# Patient Record
Sex: FEMALE | Race: WHITE | NOT HISPANIC OR LATINO | URBAN - METROPOLITAN AREA
[De-identification: names, ages, dates, MRNs, and addresses within clinical notes are randomized per-mention and may not be internally consistent; named-entity substitution may affect disease eponyms.]

---

## 2017-01-05 ENCOUNTER — OUTPATIENT (OUTPATIENT)
Dept: OUTPATIENT SERVICES | Facility: HOSPITAL | Age: 77
LOS: 1 days | End: 2017-01-05
Payer: MEDICARE

## 2017-01-05 PROCEDURE — 73502 X-RAY EXAM HIP UNI 2-3 VIEWS: CPT

## 2017-01-05 PROCEDURE — 71020: CPT | Mod: 26

## 2017-01-05 PROCEDURE — 73502 X-RAY EXAM HIP UNI 2-3 VIEWS: CPT | Mod: 26

## 2017-01-05 PROCEDURE — 71046 X-RAY EXAM CHEST 2 VIEWS: CPT

## 2017-01-11 ENCOUNTER — OUTPATIENT (OUTPATIENT)
Dept: OUTPATIENT SERVICES | Facility: HOSPITAL | Age: 77
LOS: 1 days | End: 2017-01-11
Payer: MEDICARE

## 2017-01-11 ENCOUNTER — APPOINTMENT (OUTPATIENT)
Dept: HEART AND VASCULAR | Facility: CLINIC | Age: 77
End: 2017-01-11

## 2017-01-11 VITALS — WEIGHT: 120 LBS | BODY MASS INDEX: 18.83 KG/M2 | HEIGHT: 67 IN

## 2017-01-11 DIAGNOSIS — Z01.818 ENCOUNTER FOR OTHER PREPROCEDURAL EXAMINATION: ICD-10-CM

## 2017-01-11 DIAGNOSIS — I10 ESSENTIAL (PRIMARY) HYPERTENSION: ICD-10-CM

## 2017-01-11 DIAGNOSIS — Z22.321 CARRIER OR SUSPECTED CARRIER OF METHICILLIN SUSCEPTIBLE STAPHYLOCOCCUS AUREUS: ICD-10-CM

## 2017-01-11 DIAGNOSIS — Z87.891 PERSONAL HISTORY OF NICOTINE DEPENDENCE: ICD-10-CM

## 2017-01-11 LAB
ALBUMIN SERPL ELPH-MCNC: 4.1 G/DL — SIGNIFICANT CHANGE UP (ref 3.4–5)
ALP SERPL-CCNC: 141 U/L — HIGH (ref 40–120)
ALT FLD-CCNC: 15 U/L — SIGNIFICANT CHANGE UP (ref 12–42)
ANION GAP SERPL CALC-SCNC: 2 MMOL/L — LOW (ref 9–16)
APPEARANCE UR: CLEAR — SIGNIFICANT CHANGE UP
APTT BLD: 32.7 SEC — SIGNIFICANT CHANGE UP (ref 27.5–37.4)
AST SERPL-CCNC: 19 U/L — SIGNIFICANT CHANGE UP (ref 15–37)
BACTERIA # UR AUTO: (no result) /HPF
BILIRUB SERPL-MCNC: 0.4 MG/DL — SIGNIFICANT CHANGE UP (ref 0.2–1.2)
BILIRUB UR-MCNC: NEGATIVE — SIGNIFICANT CHANGE UP
BLD GP AB SCN SERPL QL: NEGATIVE — SIGNIFICANT CHANGE UP
BUN SERPL-MCNC: 25 MG/DL — HIGH (ref 7–23)
CALCIUM SERPL-MCNC: 10.1 MG/DL — SIGNIFICANT CHANGE UP (ref 8.5–10.5)
CHLORIDE SERPL-SCNC: 105 MMOL/L — SIGNIFICANT CHANGE UP (ref 96–108)
CHOLEST SERPL-MCNC: 165 MG/DL — SIGNIFICANT CHANGE UP
CO2 SERPL-SCNC: 33 MMOL/L — HIGH (ref 22–31)
COLOR SPEC: YELLOW — SIGNIFICANT CHANGE UP
COMMENT - URINE: SIGNIFICANT CHANGE UP
CREAT SERPL-MCNC: 0.69 MG/DL — SIGNIFICANT CHANGE UP (ref 0.5–1.3)
CRP SERPL-MCNC: <0.29 MG/DL — SIGNIFICANT CHANGE UP
DIFF PNL FLD: (no result)
EPI CELLS # UR: SIGNIFICANT CHANGE UP /HPF
ERYTHROCYTE [SEDIMENTATION RATE] IN BLOOD: 16 MM/HR — SIGNIFICANT CHANGE UP
GLUCOSE SERPL-MCNC: 84 MG/DL — SIGNIFICANT CHANGE UP (ref 70–99)
GLUCOSE UR QL: NEGATIVE — SIGNIFICANT CHANGE UP
HCT VFR BLD CALC: 34.1 % — LOW (ref 34.5–45)
HDLC SERPL-MCNC: 80 MG/DL — SIGNIFICANT CHANGE UP
HGB BLD-MCNC: 11.3 G/DL — LOW (ref 11.5–15.5)
INR BLD: 0.99 — SIGNIFICANT CHANGE UP (ref 0.88–1.16)
KETONES UR-MCNC: NEGATIVE — SIGNIFICANT CHANGE UP
LEUKOCYTE ESTERASE UR-ACNC: (no result)
LIPID PNL WITH DIRECT LDL SERPL: 51 MG/DL — SIGNIFICANT CHANGE UP
MCHC RBC-ENTMCNC: 31.5 PG — SIGNIFICANT CHANGE UP (ref 27–34)
MCHC RBC-ENTMCNC: 33.1 G/DL — SIGNIFICANT CHANGE UP (ref 32–36)
MCV RBC AUTO: 95 FL — SIGNIFICANT CHANGE UP (ref 80–100)
NITRITE UR-MCNC: NEGATIVE — SIGNIFICANT CHANGE UP
PH UR: 6 — SIGNIFICANT CHANGE UP (ref 4–8)
PLATELET # BLD AUTO: 416 K/UL — HIGH (ref 150–400)
POTASSIUM SERPL-MCNC: 5.2 MMOL/L — SIGNIFICANT CHANGE UP (ref 3.5–5.3)
POTASSIUM SERPL-SCNC: 5.2 MMOL/L — SIGNIFICANT CHANGE UP (ref 3.5–5.3)
PROT SERPL-MCNC: 7.4 G/DL — SIGNIFICANT CHANGE UP (ref 6.4–8.2)
PROT UR-MCNC: NEGATIVE MG/DL — SIGNIFICANT CHANGE UP
PROTHROM AB SERPL-ACNC: 11 SEC — SIGNIFICANT CHANGE UP (ref 10–13.1)
RBC # BLD: 3.59 M/UL — LOW (ref 3.8–5.2)
RBC # FLD: 13 % — SIGNIFICANT CHANGE UP (ref 10.3–16.9)
RBC CASTS # UR COMP ASSIST: < 5 /HPF — SIGNIFICANT CHANGE UP
RH IG SCN BLD-IMP: POSITIVE — SIGNIFICANT CHANGE UP
SODIUM SERPL-SCNC: 140 MMOL/L — SIGNIFICANT CHANGE UP (ref 135–145)
SP GR SPEC: <=1.005 — SIGNIFICANT CHANGE UP (ref 1–1.03)
TOTAL CHOLESTEROL/HDL RATIO MEASUREMENT: 2.1 RATIO — SIGNIFICANT CHANGE UP
TRIGL SERPL-MCNC: 168 MG/DL — HIGH
UROBILINOGEN FLD QL: 0.2 E.U./DL — SIGNIFICANT CHANGE UP
WBC # BLD: 10.8 K/UL — HIGH (ref 3.8–10.5)
WBC # FLD AUTO: 10.8 K/UL — HIGH (ref 3.8–10.5)
WBC UR QL: > 10 /HPF

## 2017-01-11 PROCEDURE — 87641 MR-STAPH DNA AMP PROBE: CPT

## 2017-01-11 RX ORDER — LISINOPRIL 10 MG/1
10 TABLET ORAL
Refills: 0 | Status: ACTIVE | COMMUNITY

## 2017-01-11 RX ORDER — ASCORBIC ACID 500 MG
TABLET ORAL
Refills: 0 | Status: ACTIVE | COMMUNITY

## 2017-01-11 RX ORDER — ALENDRONATE SODIUM 70 MG/1
70 TABLET ORAL
Refills: 0 | Status: ACTIVE | COMMUNITY

## 2017-01-11 RX ORDER — ATORVASTATIN CALCIUM 20 MG/1
20 TABLET, FILM COATED ORAL
Refills: 0 | Status: ACTIVE | COMMUNITY

## 2017-01-11 RX ORDER — ATENOLOL 25 MG/1
25 TABLET ORAL
Refills: 0 | Status: ACTIVE | COMMUNITY

## 2017-01-11 RX ORDER — CHROMIUM 200 MCG
TABLET ORAL
Refills: 0 | Status: ACTIVE | COMMUNITY

## 2017-01-11 RX ORDER — UBIDECARENONE/VIT E ACET 100MG-5
CAPSULE ORAL
Refills: 0 | Status: ACTIVE | COMMUNITY

## 2017-01-12 VITALS — HEART RATE: 64 BPM | DIASTOLIC BLOOD PRESSURE: 70 MMHG | RESPIRATION RATE: 16 BRPM | SYSTOLIC BLOOD PRESSURE: 112 MMHG

## 2017-01-12 PROBLEM — I10 HYPERTENSION: Status: ACTIVE | Noted: 2017-01-12

## 2017-01-12 LAB
MRSA PCR RESULT.: NEGATIVE — SIGNIFICANT CHANGE UP
S AUREUS DNA NOSE QL NAA+PROBE: NEGATIVE — SIGNIFICANT CHANGE UP

## 2017-01-13 LAB
-  AMPICILLIN/SULBACTAM: SIGNIFICANT CHANGE UP
-  AMPICILLIN: SIGNIFICANT CHANGE UP
-  CEFAZOLIN: SIGNIFICANT CHANGE UP
-  CEFTRIAXONE: SIGNIFICANT CHANGE UP
-  CIPROFLOXACIN: SIGNIFICANT CHANGE UP
-  GENTAMICIN: SIGNIFICANT CHANGE UP
-  NITROFURANTOIN: SIGNIFICANT CHANGE UP
-  PIPERACILLIN/TAZOBACTAM: SIGNIFICANT CHANGE UP
-  TOBRAMYCIN: SIGNIFICANT CHANGE UP
-  TRIMETHOPRIM/SULFAMETHOXAZOLE: SIGNIFICANT CHANGE UP
CULTURE RESULTS: SIGNIFICANT CHANGE UP
METHOD TYPE: SIGNIFICANT CHANGE UP
ORGANISM # SPEC MICROSCOPIC CNT: SIGNIFICANT CHANGE UP
ORGANISM # SPEC MICROSCOPIC CNT: SIGNIFICANT CHANGE UP
SPECIMEN SOURCE: SIGNIFICANT CHANGE UP

## 2017-01-23 ENCOUNTER — MEDICATION RENEWAL (OUTPATIENT)
Age: 77
End: 2017-01-23

## 2017-01-23 DIAGNOSIS — Z87.440 PERSONAL HISTORY OF URINARY (TRACT) INFECTIONS: ICD-10-CM

## 2017-01-23 RX ORDER — CIPROFLOXACIN HYDROCHLORIDE 500 MG/1
500 TABLET, FILM COATED ORAL TWICE DAILY
Qty: 10 | Refills: 0 | Status: ACTIVE | COMMUNITY
Start: 1900-01-01 | End: 1900-01-01

## 2017-01-31 ENCOUNTER — RESULT REVIEW (OUTPATIENT)
Age: 77
End: 2017-01-31

## 2017-01-31 VITALS
WEIGHT: 119.93 LBS | OXYGEN SATURATION: 99 % | HEIGHT: 66.5 IN | SYSTOLIC BLOOD PRESSURE: 146 MMHG | TEMPERATURE: 98 F | RESPIRATION RATE: 16 BRPM | HEART RATE: 75 BPM | DIASTOLIC BLOOD PRESSURE: 70 MMHG

## 2017-01-31 NOTE — PATIENT PROFILE ADULT. - PSH
History of bilateral hip replacements  delivery delivered  x 2  History of bilateral hip replacements

## 2017-01-31 NOTE — PATIENT PROFILE ADULT. - PMH
HTN (hypertension)    Hyperlipidemia HTN (hypertension)    Hyperlipidemia    UTI (urinary tract infection)  treated with Cipro; last dose 1/27/17

## 2017-01-31 NOTE — PRE-OP CHECKLIST - SELECT TESTS ORDERED
cardiac clearance, mrsa neg,mssa neg/Urinalysis/EKG/CMP/CBC/PT/PTT/CXR EKG/CBC/INR/cardiac clearance, mrsa neg,mssa neg/CXR/CMP/Urinalysis/PT/PTT

## 2017-02-01 ENCOUNTER — INPATIENT (INPATIENT)
Facility: HOSPITAL | Age: 77
LOS: 2 days | Discharge: HOME | DRG: 468 | End: 2017-02-04
Attending: ORTHOPAEDIC SURGERY | Admitting: ORTHOPAEDIC SURGERY
Payer: MEDICARE

## 2017-02-01 DIAGNOSIS — T84.038A MECHANICAL LOOSENING OF OTHER INTERNAL PROSTHETIC JOINT, INITIAL ENCOUNTER: ICD-10-CM

## 2017-02-01 DIAGNOSIS — Z96.643 PRESENCE OF ARTIFICIAL HIP JOINT, BILATERAL: Chronic | ICD-10-CM

## 2017-02-01 LAB
APPEARANCE UR: CLEAR — SIGNIFICANT CHANGE UP
BASE EXCESS BLDA CALC-SCNC: -4.8 MMOL/L — LOW (ref -2–3)
BILIRUB UR-MCNC: NEGATIVE — SIGNIFICANT CHANGE UP
CA-I BLDA-SCNC: 1.11 MMOL/L — SIGNIFICANT CHANGE UP (ref 1.1–1.3)
COHGB MFR BLDA: 0.3 % — SIGNIFICANT CHANGE UP
COLOR SPEC: YELLOW — SIGNIFICANT CHANGE UP
DIFF PNL FLD: (no result)
GAS PNL BLDA: SIGNIFICANT CHANGE UP
GLUCOSE UR QL: NEGATIVE — SIGNIFICANT CHANGE UP
HCO3 BLDA-SCNC: 21 MMOL/L — SIGNIFICANT CHANGE UP (ref 21–28)
HCT VFR BLD CALC: 31.3 % — LOW (ref 34.5–45)
HGB BLD-MCNC: 10.5 G/DL — LOW (ref 11.5–15.5)
HGB BLDA-MCNC: 8.8 G/DL — LOW (ref 11.5–15.5)
KETONES UR-MCNC: (no result) MG/DL
LEUKOCYTE ESTERASE UR-ACNC: (no result)
MCHC RBC-ENTMCNC: 29.7 PG — SIGNIFICANT CHANGE UP (ref 27–34)
MCHC RBC-ENTMCNC: 33.5 G/DL — SIGNIFICANT CHANGE UP (ref 32–36)
MCV RBC AUTO: 88.7 FL — SIGNIFICANT CHANGE UP (ref 80–100)
METHGB MFR BLDA: 0.3 % — SIGNIFICANT CHANGE UP
NITRITE UR-MCNC: NEGATIVE — SIGNIFICANT CHANGE UP
O2 CT VFR BLDA CALC: SIGNIFICANT CHANGE UP (ref 15–23)
OXYHGB MFR BLDA: 98 % — SIGNIFICANT CHANGE UP (ref 94–100)
PCO2 BLDA: 40 MMHG — SIGNIFICANT CHANGE UP (ref 32–45)
PH BLDA: 7.33 — LOW (ref 7.35–7.45)
PH UR: 6.5 — SIGNIFICANT CHANGE UP (ref 4–8)
PLATELET # BLD AUTO: 255 K/UL — SIGNIFICANT CHANGE UP (ref 150–400)
PO2 BLDA: 216 MMHG — HIGH (ref 83–108)
POTASSIUM BLDA-SCNC: 3.7 MMOL/L — SIGNIFICANT CHANGE UP (ref 3.5–4.9)
PROT UR-MCNC: NEGATIVE MG/DL — SIGNIFICANT CHANGE UP
RBC # BLD: 3.53 M/UL — LOW (ref 3.8–5.2)
RBC # FLD: 16.5 % — SIGNIFICANT CHANGE UP (ref 10.3–16.9)
SAO2 % BLDA: 99 % — SIGNIFICANT CHANGE UP (ref 95–100)
SODIUM BLDA-SCNC: 136 MMOL/L — LOW (ref 138–146)
SP GR SPEC: 1.02 — SIGNIFICANT CHANGE UP (ref 1–1.03)
UROBILINOGEN FLD QL: 0.2 E.U./DL — SIGNIFICANT CHANGE UP
WBC # BLD: 10.1 K/UL — SIGNIFICANT CHANGE UP (ref 3.8–10.5)
WBC # FLD AUTO: 10.1 K/UL — SIGNIFICANT CHANGE UP (ref 3.8–10.5)

## 2017-02-01 PROCEDURE — P9016: CPT

## 2017-02-01 PROCEDURE — 81003 URINALYSIS AUTO W/O SCOPE: CPT

## 2017-02-01 PROCEDURE — 86850 RBC ANTIBODY SCREEN: CPT

## 2017-02-01 PROCEDURE — 86900 BLOOD TYPING SEROLOGIC ABO: CPT

## 2017-02-01 PROCEDURE — 87186 SC STD MICRODIL/AGAR DIL: CPT

## 2017-02-01 PROCEDURE — 85730 THROMBOPLASTIN TIME PARTIAL: CPT

## 2017-02-01 PROCEDURE — 85027 COMPLETE CBC AUTOMATED: CPT

## 2017-02-01 PROCEDURE — 72170 X-RAY EXAM OF PELVIS: CPT | Mod: 26

## 2017-02-01 PROCEDURE — 36430 TRANSFUSION BLD/BLD COMPNT: CPT

## 2017-02-01 PROCEDURE — 80061 LIPID PANEL: CPT

## 2017-02-01 PROCEDURE — 86923 COMPATIBILITY TEST ELECTRIC: CPT

## 2017-02-01 PROCEDURE — 81001 URINALYSIS AUTO W/SCOPE: CPT

## 2017-02-01 PROCEDURE — 86140 C-REACTIVE PROTEIN: CPT

## 2017-02-01 PROCEDURE — 85652 RBC SED RATE AUTOMATED: CPT

## 2017-02-01 PROCEDURE — 87086 URINE CULTURE/COLONY COUNT: CPT

## 2017-02-01 PROCEDURE — 86901 BLOOD TYPING SEROLOGIC RH(D): CPT

## 2017-02-01 PROCEDURE — 85610 PROTHROMBIN TIME: CPT

## 2017-02-01 PROCEDURE — 80053 COMPREHEN METABOLIC PANEL: CPT

## 2017-02-01 RX ORDER — LISINOPRIL 2.5 MG/1
1 TABLET ORAL
Qty: 0 | Refills: 0 | COMMUNITY

## 2017-02-01 RX ORDER — FAMOTIDINE 10 MG/ML
20 INJECTION INTRAVENOUS EVERY 12 HOURS
Qty: 0 | Refills: 0 | Status: DISCONTINUED | OUTPATIENT
Start: 2017-02-01 | End: 2017-02-04

## 2017-02-01 RX ORDER — OXYCODONE HYDROCHLORIDE 5 MG/1
10 TABLET ORAL EVERY 12 HOURS
Qty: 0 | Refills: 0 | Status: DISCONTINUED | OUTPATIENT
Start: 2017-02-02 | End: 2017-02-04

## 2017-02-01 RX ORDER — SODIUM CHLORIDE 9 MG/ML
1000 INJECTION, SOLUTION INTRAVENOUS
Qty: 0 | Refills: 0 | Status: DISCONTINUED | OUTPATIENT
Start: 2017-02-01 | End: 2017-02-04

## 2017-02-01 RX ORDER — OXYCODONE HYDROCHLORIDE 5 MG/1
5 TABLET ORAL EVERY 4 HOURS
Qty: 0 | Refills: 0 | Status: DISCONTINUED | OUTPATIENT
Start: 2017-02-02 | End: 2017-02-04

## 2017-02-01 RX ORDER — ATENOLOL 25 MG/1
1 TABLET ORAL
Qty: 0 | Refills: 0 | COMMUNITY

## 2017-02-01 RX ORDER — POLYETHYLENE GLYCOL 3350 17 G/17G
17 POWDER, FOR SOLUTION ORAL DAILY
Qty: 0 | Refills: 0 | Status: DISCONTINUED | OUTPATIENT
Start: 2017-02-02 | End: 2017-02-04

## 2017-02-01 RX ORDER — LISINOPRIL 2.5 MG/1
10 TABLET ORAL DAILY
Qty: 0 | Refills: 0 | Status: DISCONTINUED | OUTPATIENT
Start: 2017-02-01 | End: 2017-02-04

## 2017-02-01 RX ORDER — MORPHINE SULFATE 50 MG/1
2 CAPSULE, EXTENDED RELEASE ORAL
Qty: 0 | Refills: 0 | Status: DISCONTINUED | OUTPATIENT
Start: 2017-02-01 | End: 2017-02-04

## 2017-02-01 RX ORDER — ASPIRIN/CALCIUM CARB/MAGNESIUM 324 MG
325 TABLET ORAL
Qty: 0 | Refills: 0 | Status: DISCONTINUED | OUTPATIENT
Start: 2017-02-02 | End: 2017-02-04

## 2017-02-01 RX ORDER — CELECOXIB 200 MG/1
400 CAPSULE ORAL ONCE
Qty: 0 | Refills: 0 | Status: COMPLETED | OUTPATIENT
Start: 2017-02-01 | End: 2017-02-01

## 2017-02-01 RX ORDER — ATENOLOL 25 MG/1
25 TABLET ORAL DAILY
Qty: 0 | Refills: 0 | Status: DISCONTINUED | OUTPATIENT
Start: 2017-02-01 | End: 2017-02-04

## 2017-02-01 RX ORDER — ONDANSETRON 8 MG/1
4 TABLET, FILM COATED ORAL EVERY 6 HOURS
Qty: 0 | Refills: 0 | Status: DISCONTINUED | OUTPATIENT
Start: 2017-02-01 | End: 2017-02-04

## 2017-02-01 RX ORDER — OXYCODONE HYDROCHLORIDE 5 MG/1
10 TABLET ORAL ONCE
Qty: 0 | Refills: 0 | Status: DISCONTINUED | OUTPATIENT
Start: 2017-02-01 | End: 2017-02-01

## 2017-02-01 RX ORDER — OXYCODONE HYDROCHLORIDE 5 MG/1
10 TABLET ORAL EVERY 4 HOURS
Qty: 0 | Refills: 0 | Status: DISCONTINUED | OUTPATIENT
Start: 2017-02-02 | End: 2017-02-04

## 2017-02-01 RX ORDER — ACETAMINOPHEN 500 MG
650 TABLET ORAL EVERY 6 HOURS
Qty: 0 | Refills: 0 | Status: DISCONTINUED | OUTPATIENT
Start: 2017-02-02 | End: 2017-02-04

## 2017-02-01 RX ORDER — ATORVASTATIN CALCIUM 80 MG/1
1 TABLET, FILM COATED ORAL
Qty: 0 | Refills: 0 | COMMUNITY

## 2017-02-01 RX ORDER — MAGNESIUM HYDROXIDE 400 MG/1
30 TABLET, CHEWABLE ORAL DAILY
Qty: 0 | Refills: 0 | Status: DISCONTINUED | OUTPATIENT
Start: 2017-02-02 | End: 2017-02-04

## 2017-02-01 RX ORDER — CEFAZOLIN SODIUM 1 G
2000 VIAL (EA) INJECTION EVERY 8 HOURS
Qty: 0 | Refills: 0 | Status: COMPLETED | OUTPATIENT
Start: 2017-02-02 | End: 2017-02-02

## 2017-02-01 RX ORDER — SENNA PLUS 8.6 MG/1
2 TABLET ORAL AT BEDTIME
Qty: 0 | Refills: 0 | Status: DISCONTINUED | OUTPATIENT
Start: 2017-02-02 | End: 2017-02-04

## 2017-02-01 RX ADMIN — OXYCODONE HYDROCHLORIDE 10 MILLIGRAM(S): 5 TABLET ORAL at 14:20

## 2017-02-01 RX ADMIN — CELECOXIB 400 MILLIGRAM(S): 200 CAPSULE ORAL at 14:20

## 2017-02-01 NOTE — H&P ADULT. - PROBLEM SELECTOR PLAN 1
Admit to Orthopaedic Service.  Presents today for elective Left Total Hip Arthroplasty.  Pt medically stable and cleared for procedure today by Dr. Diana MD.

## 2017-02-01 NOTE — H&P ADULT. - LAB RESULTS AND INTERPRETATION
Preop CBC (mild anemia 11.3/34.1), BMP, PT/PTT/INR, UA (+large leuk est, WBCs >10, Many bacteria), UCx (>100,000 cfu's E coli) - WNL per medical clearance; Will repeat UA/UCx on DOS to ensure UTI treatment of Cipro x 5 days BID was successful; Nasal swab negative.

## 2017-02-01 NOTE — H&P ADULT. - PMH
HTN (hypertension)    Hyperlipidemia    UTI (urinary tract infection)  treated with Cipro; last dose 1/27/17

## 2017-02-01 NOTE — H&P ADULT. - HISTORY OF PRESENT ILLNESS
76 yo F presents c/o left hip pain and soreness. Pt underwent Left MARC in Dec 2013 and dislocated 8 weeks after. Since that time, she has developed bothersome pain radiating to her left knee and generalized soreness to her Left LE. Radiograph shows massive bone loss to acetabulum. Pt states that her hip clicks when walking. Pt reports the symptoms began spontaneously post-operatively and without accident or trauma. Pt pain has progressively worsened without improvement and has become increasingly unresponsive to conservative management. Pt ambulates with a walker for assistance at home for the last 6 weeks. Previously, she was able to get by with just a cane but can no longer do so. Denies numbness/tingling of extremities, F/C/N/V, CP, SOB today.     Presents today for Revision left Total Hip Arthroplasty.

## 2017-02-02 LAB
ANION GAP SERPL CALC-SCNC: 13 MMOL/L — SIGNIFICANT CHANGE UP (ref 9–16)
BUN SERPL-MCNC: 24 MG/DL — HIGH (ref 7–23)
CALCIUM SERPL-MCNC: 8.2 MG/DL — LOW (ref 8.5–10.5)
CHLORIDE SERPL-SCNC: 107 MMOL/L — SIGNIFICANT CHANGE UP (ref 96–108)
CO2 SERPL-SCNC: 21 MMOL/L — LOW (ref 22–31)
CREAT SERPL-MCNC: 0.64 MG/DL — SIGNIFICANT CHANGE UP (ref 0.5–1.3)
GLUCOSE SERPL-MCNC: 139 MG/DL — HIGH (ref 70–99)
HCT VFR BLD CALC: 28.5 % — LOW (ref 34.5–45)
HGB BLD-MCNC: 9.3 G/DL — LOW (ref 11.5–15.5)
MCHC RBC-ENTMCNC: 29.2 PG — SIGNIFICANT CHANGE UP (ref 27–34)
MCHC RBC-ENTMCNC: 32.6 G/DL — SIGNIFICANT CHANGE UP (ref 32–36)
MCV RBC AUTO: 89.3 FL — SIGNIFICANT CHANGE UP (ref 80–100)
PLATELET # BLD AUTO: 277 K/UL — SIGNIFICANT CHANGE UP (ref 150–400)
POTASSIUM SERPL-MCNC: 4.2 MMOL/L — SIGNIFICANT CHANGE UP (ref 3.5–5.3)
POTASSIUM SERPL-SCNC: 4.2 MMOL/L — SIGNIFICANT CHANGE UP (ref 3.5–5.3)
RBC # BLD: 3.19 M/UL — LOW (ref 3.8–5.2)
RBC # FLD: 16.8 % — SIGNIFICANT CHANGE UP (ref 10.3–16.9)
SODIUM SERPL-SCNC: 141 MMOL/L — SIGNIFICANT CHANGE UP (ref 135–145)
WBC # BLD: 12.8 K/UL — HIGH (ref 3.8–10.5)
WBC # FLD AUTO: 12.8 K/UL — HIGH (ref 3.8–10.5)

## 2017-02-02 RX ORDER — SODIUM CHLORIDE 9 MG/ML
500 INJECTION INTRAMUSCULAR; INTRAVENOUS; SUBCUTANEOUS ONCE
Qty: 0 | Refills: 0 | Status: COMPLETED | OUTPATIENT
Start: 2017-02-02 | End: 2017-02-02

## 2017-02-02 RX ORDER — ATORVASTATIN CALCIUM 80 MG/1
10 TABLET, FILM COATED ORAL DAILY
Qty: 0 | Refills: 0 | Status: DISCONTINUED | OUTPATIENT
Start: 2017-02-02 | End: 2017-02-02

## 2017-02-02 RX ORDER — DOCUSATE SODIUM 100 MG
100 CAPSULE ORAL THREE TIMES A DAY
Qty: 0 | Refills: 0 | Status: DISCONTINUED | OUTPATIENT
Start: 2017-02-02 | End: 2017-02-04

## 2017-02-02 RX ORDER — KETOROLAC TROMETHAMINE 30 MG/ML
15 SYRINGE (ML) INJECTION EVERY 6 HOURS
Qty: 0 | Refills: 0 | Status: DISCONTINUED | OUTPATIENT
Start: 2017-02-02 | End: 2017-02-03

## 2017-02-02 RX ORDER — ASPIRIN/CALCIUM CARB/MAGNESIUM 324 MG
325 TABLET ORAL
Qty: 0 | Refills: 0 | Status: DISCONTINUED | OUTPATIENT
Start: 2017-02-02 | End: 2017-02-02

## 2017-02-02 RX ORDER — ACETAMINOPHEN 500 MG
975 TABLET ORAL EVERY 8 HOURS
Qty: 0 | Refills: 0 | Status: DISCONTINUED | OUTPATIENT
Start: 2017-02-02 | End: 2017-02-04

## 2017-02-02 RX ORDER — SENNA PLUS 8.6 MG/1
2 TABLET ORAL AT BEDTIME
Qty: 0 | Refills: 0 | Status: DISCONTINUED | OUTPATIENT
Start: 2017-02-02 | End: 2017-02-04

## 2017-02-02 RX ORDER — CELECOXIB 200 MG/1
200 CAPSULE ORAL
Qty: 0 | Refills: 0 | Status: DISCONTINUED | OUTPATIENT
Start: 2017-02-02 | End: 2017-02-04

## 2017-02-02 RX ORDER — FAMOTIDINE 10 MG/ML
20 INJECTION INTRAVENOUS
Qty: 0 | Refills: 0 | Status: DISCONTINUED | OUTPATIENT
Start: 2017-02-02 | End: 2017-02-03

## 2017-02-02 RX ORDER — DOCUSATE SODIUM 100 MG
100 CAPSULE ORAL THREE TIMES A DAY
Qty: 0 | Refills: 0 | Status: DISCONTINUED | OUTPATIENT
Start: 2017-02-02 | End: 2017-02-02

## 2017-02-02 RX ORDER — TRAMADOL HYDROCHLORIDE 50 MG/1
50 TABLET ORAL EVERY 4 HOURS
Qty: 0 | Refills: 0 | Status: DISCONTINUED | OUTPATIENT
Start: 2017-02-02 | End: 2017-02-04

## 2017-02-02 RX ORDER — POLYETHYLENE GLYCOL 3350 17 G/17G
17 POWDER, FOR SOLUTION ORAL DAILY
Qty: 0 | Refills: 0 | Status: DISCONTINUED | OUTPATIENT
Start: 2017-02-02 | End: 2017-02-02

## 2017-02-02 RX ORDER — ATORVASTATIN CALCIUM 80 MG/1
10 TABLET, FILM COATED ORAL DAILY
Qty: 0 | Refills: 0 | Status: DISCONTINUED | OUTPATIENT
Start: 2017-02-02 | End: 2017-02-04

## 2017-02-02 RX ADMIN — FAMOTIDINE 20 MILLIGRAM(S): 10 INJECTION INTRAVENOUS at 18:12

## 2017-02-02 RX ADMIN — Medication 975 MILLIGRAM(S): at 22:24

## 2017-02-02 RX ADMIN — Medication 15 MILLIGRAM(S): at 13:21

## 2017-02-02 RX ADMIN — Medication 325 MILLIGRAM(S): at 18:12

## 2017-02-02 RX ADMIN — Medication 100 MILLIGRAM(S): at 02:06

## 2017-02-02 RX ADMIN — ATORVASTATIN CALCIUM 10 MILLIGRAM(S): 80 TABLET, FILM COATED ORAL at 21:24

## 2017-02-02 RX ADMIN — SENNA PLUS 2 TABLET(S): 8.6 TABLET ORAL at 21:24

## 2017-02-02 RX ADMIN — SODIUM CHLORIDE 120 MILLILITER(S): 9 INJECTION, SOLUTION INTRAVENOUS at 13:19

## 2017-02-02 RX ADMIN — Medication 15 MILLIGRAM(S): at 23:00

## 2017-02-02 RX ADMIN — Medication 975 MILLIGRAM(S): at 10:20

## 2017-02-02 RX ADMIN — Medication 975 MILLIGRAM(S): at 10:03

## 2017-02-02 RX ADMIN — Medication 100 MILLIGRAM(S): at 21:24

## 2017-02-02 RX ADMIN — MORPHINE SULFATE 2 MILLIGRAM(S): 50 CAPSULE, EXTENDED RELEASE ORAL at 02:42

## 2017-02-02 RX ADMIN — SODIUM CHLORIDE 500 MILLILITER(S): 9 INJECTION INTRAMUSCULAR; INTRAVENOUS; SUBCUTANEOUS at 12:46

## 2017-02-02 RX ADMIN — Medication 100 MILLIGRAM(S): at 10:10

## 2017-02-02 RX ADMIN — MORPHINE SULFATE 2 MILLIGRAM(S): 50 CAPSULE, EXTENDED RELEASE ORAL at 02:19

## 2017-02-02 RX ADMIN — CELECOXIB 200 MILLIGRAM(S): 200 CAPSULE ORAL at 18:12

## 2017-02-02 RX ADMIN — FAMOTIDINE 20 MILLIGRAM(S): 10 INJECTION INTRAVENOUS at 05:40

## 2017-02-02 RX ADMIN — Medication 15 MILLIGRAM(S): at 23:30

## 2017-02-02 RX ADMIN — Medication 975 MILLIGRAM(S): at 21:24

## 2017-02-02 NOTE — PHYSICAL THERAPY INITIAL EVALUATION ADULT - PERTINENT HX OF CURRENT PROBLEM, REHAB EVAL
Patient is a 78 y/o F admitted to [facility] with chief c/o of chronic/progressively worsening left hip pain presenting for elective hip revision.

## 2017-02-02 NOTE — PHYSICAL THERAPY INITIAL EVALUATION ADULT - ADDITIONAL COMMENTS
Patient reports living alone in elevator building with no steps to enter. Patient reports having help from regular house keeper as well as her daughter who frequently visits.

## 2017-02-02 NOTE — OCCUPATIONAL THERAPY INITIAL EVALUATION ADULT - MD ORDER
78 yo F presents c/o left hip pain and soreness. Pt underwent Left MARC in Dec 2013 and dislocated 8 weeks after. Since that time, she has developed bothersome pain radiating to her left knee and generalized soreness to her Left LE. Radiograph shows massive bone loss to acetabulum. Pt states that her hip clicks when walking. Pt reports the symptoms began spontaneously post-operatively and without accident or trauma.

## 2017-02-02 NOTE — OCCUPATIONAL THERAPY INITIAL EVALUATION ADULT - PLANNED THERAPY INTERVENTIONS, OT EVAL
balance training/strengthening/IADL retraining/bed mobility training/transfer training/ADL retraining

## 2017-02-02 NOTE — PHYSICAL THERAPY INITIAL EVALUATION ADULT - PLANNED THERAPY INTERVENTIONS, PT EVAL
balance training/strengthening/ROM/transfer training/bed mobility training/gait training/postural re-education

## 2017-02-02 NOTE — PHYSICAL THERAPY INITIAL EVALUATION ADULT - GENERAL OBSERVATIONS, REHAB EVAL
Patient encountered supine in bed, NAD, +CB, +IV, +A-line, RN present/cleared for ambulation, +SCD's b/l, +chambers, +hip abd pillow.

## 2017-02-02 NOTE — PHYSICAL THERAPY INITIAL EVALUATION ADULT - IMPAIRMENTS FOUND, PT EVAL
ROM/aerobic capacity/endurance/ergonomics and body mechanics/gait, locomotion, and balance/poor safety awareness/muscle strength/posture

## 2017-02-02 NOTE — PHYSICAL THERAPY INITIAL EVALUATION ADULT - RANGE OF MOTION EXAMINATION, REHAB EVAL
Right LE ROM was WNL (within normal limits)/deficits as listed below/left LE limited secondary to hip precautions/bilateral upper extremity ROM was WNL (within normal limits)

## 2017-02-02 NOTE — PHYSICAL THERAPY INITIAL EVALUATION ADULT - CRITERIA FOR SKILLED THERAPEUTIC INTERVENTIONS
predicted duration of therapy intervention/functional limitations in following categories/rehab potential/impairments found/anticipated equipment needs at discharge/anticipated discharge recommendation/therapy frequency/risk reduction/prevention

## 2017-02-02 NOTE — OCCUPATIONAL THERAPY INITIAL EVALUATION ADULT - GENERAL OBSERVATIONS, REHAB EVAL
Right hand dominant. Chart reviewed, patient cleared for OT by KARINA Mars. Received seated at edge of bed with physical therapist, NAD, +trish, +alanna.

## 2017-02-02 NOTE — OCCUPATIONAL THERAPY INITIAL EVALUATION ADULT - ADDITIONAL COMMENTS
Per patient PTA she was independent with ADL, ambulating with RW. Has  2 days/week, plans to hire private aide upon return home. Has shower seat, grab bar, raised toilet seat, reacher, RW, cane.

## 2017-02-03 ENCOUNTER — TRANSCRIPTION ENCOUNTER (OUTPATIENT)
Age: 77
End: 2017-02-03

## 2017-02-03 LAB
-  AMPICILLIN/SULBACTAM: SIGNIFICANT CHANGE UP
-  AMPICILLIN: SIGNIFICANT CHANGE UP
-  CEFAZOLIN: SIGNIFICANT CHANGE UP
-  CEFTRIAXONE: SIGNIFICANT CHANGE UP
-  CIPROFLOXACIN: SIGNIFICANT CHANGE UP
-  GENTAMICIN: SIGNIFICANT CHANGE UP
-  NITROFURANTOIN: SIGNIFICANT CHANGE UP
-  PIPERACILLIN/TAZOBACTAM: SIGNIFICANT CHANGE UP
-  TOBRAMYCIN: SIGNIFICANT CHANGE UP
-  TRIMETHOPRIM/SULFAMETHOXAZOLE: SIGNIFICANT CHANGE UP
CULTURE RESULTS: SIGNIFICANT CHANGE UP
METHOD TYPE: SIGNIFICANT CHANGE UP
ORGANISM # SPEC MICROSCOPIC CNT: SIGNIFICANT CHANGE UP
ORGANISM # SPEC MICROSCOPIC CNT: SIGNIFICANT CHANGE UP
SPECIMEN SOURCE: SIGNIFICANT CHANGE UP
SURGICAL PATHOLOGY STUDY: SIGNIFICANT CHANGE UP

## 2017-02-03 RX ORDER — ASPIRIN/CALCIUM CARB/MAGNESIUM 324 MG
1 TABLET ORAL
Qty: 0 | Refills: 0 | COMMUNITY
Start: 2017-02-03

## 2017-02-03 RX ORDER — POLYETHYLENE GLYCOL 3350 17 G/17G
17 POWDER, FOR SOLUTION ORAL
Qty: 0 | Refills: 0 | COMMUNITY
Start: 2017-02-03

## 2017-02-03 RX ORDER — DOCUSATE SODIUM 100 MG
1 CAPSULE ORAL
Qty: 0 | Refills: 0 | COMMUNITY
Start: 2017-02-03

## 2017-02-03 RX ORDER — ALENDRONATE SODIUM 70 MG/1
1 TABLET ORAL
Qty: 0 | Refills: 0 | COMMUNITY

## 2017-02-03 RX ORDER — SENNA PLUS 8.6 MG/1
2 TABLET ORAL
Qty: 0 | Refills: 0 | COMMUNITY
Start: 2017-02-03

## 2017-02-03 RX ADMIN — FAMOTIDINE 20 MILLIGRAM(S): 10 INJECTION INTRAVENOUS at 05:27

## 2017-02-03 RX ADMIN — Medication 15 MILLIGRAM(S): at 06:49

## 2017-02-03 RX ADMIN — Medication 975 MILLIGRAM(S): at 06:27

## 2017-02-03 RX ADMIN — CELECOXIB 200 MILLIGRAM(S): 200 CAPSULE ORAL at 09:19

## 2017-02-03 RX ADMIN — ATENOLOL 25 MILLIGRAM(S): 25 TABLET ORAL at 05:27

## 2017-02-03 RX ADMIN — Medication 15 MILLIGRAM(S): at 17:57

## 2017-02-03 RX ADMIN — CELECOXIB 200 MILLIGRAM(S): 200 CAPSULE ORAL at 10:16

## 2017-02-03 RX ADMIN — SENNA PLUS 2 TABLET(S): 8.6 TABLET ORAL at 20:52

## 2017-02-03 RX ADMIN — Medication 975 MILLIGRAM(S): at 20:53

## 2017-02-03 RX ADMIN — Medication 975 MILLIGRAM(S): at 21:45

## 2017-02-03 RX ADMIN — Medication 325 MILLIGRAM(S): at 17:58

## 2017-02-03 RX ADMIN — POLYETHYLENE GLYCOL 3350 17 GRAM(S): 17 POWDER, FOR SOLUTION ORAL at 14:17

## 2017-02-03 RX ADMIN — CELECOXIB 200 MILLIGRAM(S): 200 CAPSULE ORAL at 17:58

## 2017-02-03 RX ADMIN — OXYCODONE HYDROCHLORIDE 10 MILLIGRAM(S): 5 TABLET ORAL at 17:58

## 2017-02-03 RX ADMIN — OXYCODONE HYDROCHLORIDE 10 MILLIGRAM(S): 5 TABLET ORAL at 18:45

## 2017-02-03 RX ADMIN — Medication 975 MILLIGRAM(S): at 14:17

## 2017-02-03 RX ADMIN — Medication 100 MILLIGRAM(S): at 05:27

## 2017-02-03 RX ADMIN — ATORVASTATIN CALCIUM 10 MILLIGRAM(S): 80 TABLET, FILM COATED ORAL at 20:52

## 2017-02-03 RX ADMIN — OXYCODONE HYDROCHLORIDE 10 MILLIGRAM(S): 5 TABLET ORAL at 05:27

## 2017-02-03 RX ADMIN — Medication 975 MILLIGRAM(S): at 15:34

## 2017-02-03 RX ADMIN — Medication 100 MILLIGRAM(S): at 14:17

## 2017-02-03 RX ADMIN — Medication 100 MILLIGRAM(S): at 20:51

## 2017-02-03 RX ADMIN — Medication 975 MILLIGRAM(S): at 05:28

## 2017-02-03 RX ADMIN — Medication 15 MILLIGRAM(S): at 06:19

## 2017-02-03 RX ADMIN — OXYCODONE HYDROCHLORIDE 10 MILLIGRAM(S): 5 TABLET ORAL at 06:27

## 2017-02-03 RX ADMIN — Medication 15 MILLIGRAM(S): at 13:04

## 2017-02-03 RX ADMIN — Medication 15 MILLIGRAM(S): at 18:30

## 2017-02-03 RX ADMIN — Medication 15 MILLIGRAM(S): at 12:06

## 2017-02-03 RX ADMIN — LISINOPRIL 10 MILLIGRAM(S): 2.5 TABLET ORAL at 12:06

## 2017-02-03 RX ADMIN — FAMOTIDINE 20 MILLIGRAM(S): 10 INJECTION INTRAVENOUS at 17:58

## 2017-02-03 RX ADMIN — Medication 325 MILLIGRAM(S): at 05:27

## 2017-02-03 RX ADMIN — CELECOXIB 200 MILLIGRAM(S): 200 CAPSULE ORAL at 18:00

## 2017-02-03 RX ADMIN — TRAMADOL HYDROCHLORIDE 50 MILLIGRAM(S): 50 TABLET ORAL at 20:53

## 2017-02-03 RX ADMIN — TRAMADOL HYDROCHLORIDE 50 MILLIGRAM(S): 50 TABLET ORAL at 21:45

## 2017-02-03 NOTE — DISCHARGE NOTE ADULT - HOME CARE AGENCY
[facility]  [phone] Skilled Nursing and Physical Therapy  . Start of Care 2/05/2017

## 2017-02-03 NOTE — DISCHARGE NOTE ADULT - CARE PROVIDER_API CALL
Jesus Esquivel (MD), Orthopaedic Surgery  [address] [address]  Phone: [phone]  Fax: [phone]

## 2017-02-03 NOTE — PROGRESS NOTE ADULT - SUBJECTIVE AND OBJECTIVE BOX
ORTHO NOTE    [x ] Pt seen/examined.  [x ] Pt without any complaints/in NAD.    [ ] Pt complains of:      ROS: [ ] Fever  [ ] Chills  [ ] CP [ ] SOB [ ] Dysnea  [ ] Palpitations [ ] Cough [ ] N/V/C/D [ ] Paresthia [ ] Other     [x ] ROS  otherwise negative    .    PHYSICAL EXAM:    Vital Signs Last 24 Hrs  T(C): 36.3, Max: 37.3 (02-02 @ 15:52)  T(F): 97.3, Max: 99.2 (02-02 @ 15:52)  HR: 73 (73 - 84)  BP: 130/65 (91/49 - 130/65)  BP(mean): --  RR: 15 (15 - 18)  SpO2: 96% (95% - 98%)    I&O's Detail  I & Os for 24h ending 03 Feb 2017 07:00  =============================================  IN:    lactated ringers.: 1080 ml    Sodium Chloride 0.9% IV Bolus: 500 ml    IV PiggyBack: 50 ml    Total IN: 1630 ml  ---------------------------------------------  OUT:    Voided: 1200 ml    Indwelling Catheter - Urethral: 370 ml    Total OUT: 1570 ml  ---------------------------------------------  Total NET: 60 ml    I & Os for current day (as of 03 Feb 2017 15:02)  =============================================  IN:    Total IN: 0 ml  ---------------------------------------------  OUT:    Voided: 800 ml    Total OUT: 800 ml  ---------------------------------------------  Total NET: -800 ml       CAPILLARY BLOOD GLUCOSE                  Neuro: AAOX3    Lungs: CTA, IS demonstrated    CV:    ABD: soft, nontender    Ext: left hip dressing cdi; left LE NVID; posterior hip precautions    LABS                        9.3    12.8  )-----------( 277      ( 02 Feb 2017 04:01 )             28.5                                02 Feb 2017 04:02    141    |  107    |  24     ----------------------------<  139    4.2     |  21     |  0.64     Ca    8.2        02 Feb 2017 04:02        [ ] Other Labs  [ ] None ordered            Please check or Sun'aq when present:  •  Heart Failure:    [ ] Acute        [ ]  Acute on Chronic        [ ] Chronic         [ ] Diastolic     [ ]  Combined    •  EDILSON:     [ ] ATN        [ ]  Renal medullary necrosis       [ ]  Renal cortical necrosis                  [ ] Other pathological Lesion:  •  CKD:  [ ] Stage I   [ ] Stage II  [ ] Stage III    [ ]Stage IV   [ ]  CKD V   [ ]  Other/Unspecified:    •  Abdominal Nutritional Status:   [ ] Malnutrition-See Nutrition note    [ ] Cachexia   [ ]  Other        [ ] Supplement ordered:            [ ] Morbid Obesity: BMI >=40         ASSESSMENT/PLAN:      STATUS POST: left hip acetabular revision POD2    CONTINUE:          [ ] PT- 20lb PWB with posterior hip precautions    [ ] DVT PPX- ASA, scd boots    [ ] Pain Mgt- PO meds    [ ] Dispo plan- home with 24-hour private pay home health aid (discharge team is coordinating services)

## 2017-02-03 NOTE — DISCHARGE NOTE ADULT - MEDICATION SUMMARY - MEDICATIONS TO TAKE
I will START or STAY ON the medications listed below when I get home from the hospital:    Aleve  --  by mouth , As Needed  -- Indication: For Home    aspirin 325 mg oral delayed release tablet  -- 1 tab(s) by mouth 2 times a day Take for 4 weeks after surgery to prevent blood clots OVER THE COUNTER MEDICINE  -- Indication: For dvt prophylaxis    oxyCODONE 5 mg oral capsule  -- 1 cap(s) by mouth every 4 hours MDD:1-2 tabs PO q4-6 hrs PRN  -- Caution federal law prohibits the transfer of this drug to any person other  than the person for whom it was prescribed.  It is very important that you take or use this exactly as directed.  Do not skip doses or discontinue unless directed by your doctor.  May cause drowsiness.  Alcohol may intensify this effect.  Use care when operating dangerous machinery.  This prescription cannot be refilled.  Using more of this medication than prescribed may cause serious breathing problems.    -- Indication: For pain    lisinopril 10 mg oral tablet  -- 1 tab(s) by mouth once a day  -- Indication: For Home    atorvastatin 10 mg oral tablet  -- 1 tab(s) by mouth once a day  -- Indication: For Home    atenolol 25 mg oral tablet  -- 1 tab(s) by mouth once a day  -- Indication: For Home    Fosamax 70 mg oral tablet  -- 1 tab(s) by mouth once a week Do not restart until instructed by Dr. Esquivel  -- Indication: For Home    bisacodyl 5 mg oral delayed release tablet  -- 1 tab(s) by mouth every 12 hours, As needed, Constipation  -- Indication: For stool softner    bisacodyl 10 mg rectal suppository  -- 1 suppository(ies) rectally once a day, As needed, If no bowel movement by POD#2  -- Indication: For stool softner    docusate sodium 100 mg oral capsule  -- 1 cap(s) by mouth 3 times a day  -- Indication: For stool softner    polyethylene glycol 3350 oral powder for reconstitution  -- 17 gram(s) by mouth once a day  -- Indication: For stool softner    senna oral tablet  -- 2 tab(s) by mouth once a day (at bedtime)  -- Indication: For stool aoftner

## 2017-02-03 NOTE — DISCHARGE NOTE ADULT - NS AS ACTIVITY OBS
Do not drive or operate machinery/Do not make important decisions/Showering allowed/No Heavy lifting/straining

## 2017-02-03 NOTE — DISCHARGE NOTE ADULT - ADDITIONAL INSTRUCTIONS
Follow up with your primary care provider. You must have 24-hour home health attendant to monitor your safety and help with activities of daily living (showering, dressing, etc.)

## 2017-02-03 NOTE — DISCHARGE NOTE ADULT - HOSPITAL COURSE
Admit 2/1/17  OR 2/1/17- L hip acetabular revision (posterior approach)  Periop Antibx  DVT ppx  PT/OT  Pain mgt

## 2017-02-03 NOTE — DISCHARGE NOTE ADULT - CARE PLAN
Principal Discharge DX:	Loosening of prosthetic hip  Goal:	Improved ambulation and decreased risk for left hip dislocation  Instructions for follow-up, activity and diet:	20 pound weight bearing on left leg with rolling walker and 24-hour assistance.  Please place the left leg on the scale to  what 20 pounds of weight feels like at home.  Posterior hip precautions-- keep abductor pillow between legs.    Knee should always be lower than operative hip.  Sit on high chair.  If you do not have a high chair use cushions on the chair.  No bending from waist to the floor either from sitting or standing position, must use long hander Principal Discharge DX:	Loosening of prosthetic hip  Goal:	Improved ambulation and decreased risk for left hip dislocation  Instructions for follow-up, activity and diet:	20 pound weight bearing on left leg with rolling walker and 24-hour assistance.  Please place the left leg on the scale to  what 20 pounds of weight feels like at home.  Posterior hip precautions-- keep abductor pillow between legs.    Knee should always be lower than operative hip.  Sit on high chair.  If you do not have a high chair use cushions on the chair.  No bending from waist to the floor either from sitting or standing position, must use long handled reacher to pick things up and must use long handled equipment for dressing.  No crossing your legs, so keep pillow between your legs.  No turning your operated hip inwards (no pigeon toes).  Try to stay on your back in the bed.  No strenuous activity, heavy lifting, driving, tub bathing, or returning to work until cleared by MD.  You may shower with assistance--dressing is waterproof.  Remove dressing after post op day 7, then leave incision open to air.  Follow up with Dr. Esquivel to schedule an appt within 10-14 days.  If you don't have a bowel movement by post op day 3, then take Milk of Magnesia (over the counter).  If no bowel movement by at least post op day 5, then use a Dulcolax suppository (over the counter) and/or a Fleets enema--if still no bowel movement, call your MD.  Contact your doctor if you experience: fever greater than 101.5, chills, chest pain, difficulty breathing, bleeding, redness or heat around the incision.

## 2017-02-03 NOTE — DISCHARGE NOTE ADULT - PLAN OF CARE
Improved ambulation and decreased risk for left hip dislocation 20 pound weight bearing on left leg with rolling walker and 24-hour assistance.  Please place the left leg on the scale to  what 20 pounds of weight feels like at home.  Posterior hip precautions-- keep abductor pillow between legs.    Knee should always be lower than operative hip.  Sit on high chair.  If you do not have a high chair use cushions on the chair.  No bending from waist to the floor either from sitting or standing position, must use long hander 20 pound weight bearing on left leg with rolling walker and 24-hour assistance.  Please place the left leg on the scale to  what 20 pounds of weight feels like at home.  Posterior hip precautions-- keep abductor pillow between legs.    Knee should always be lower than operative hip.  Sit on high chair.  If you do not have a high chair use cushions on the chair.  No bending from waist to the floor either from sitting or standing position, must use long handled reacher to pick things up and must use long handled equipment for dressing.  No crossing your legs, so keep pillow between your legs.  No turning your operated hip inwards (no pigeon toes).  Try to stay on your back in the bed.  No strenuous activity, heavy lifting, driving, tub bathing, or returning to work until cleared by MD.  You may shower with assistance--dressing is waterproof.  Remove dressing after post op day 7, then leave incision open to air.  Follow up with Dr. Esquivel to schedule an appt within 10-14 days.  If you don't have a bowel movement by post op day 3, then take Milk of Magnesia (over the counter).  If no bowel movement by at least post op day 5, then use a Dulcolax suppository (over the counter) and/or a Fleets enema--if still no bowel movement, call your MD.  Contact your doctor if you experience: fever greater than 101.5, chills, chest pain, difficulty breathing, bleeding, redness or heat around the incision.

## 2017-02-03 NOTE — DISCHARGE NOTE ADULT - PATIENT PORTAL LINK FT
“You can access the Follow[facility] Patient Portal, offered by [facility], by registering with the following website: [email or web address]”

## 2017-02-04 VITALS
HEART RATE: 71 BPM | OXYGEN SATURATION: 98 % | SYSTOLIC BLOOD PRESSURE: 130 MMHG | TEMPERATURE: 97 F | DIASTOLIC BLOOD PRESSURE: 59 MMHG | RESPIRATION RATE: 16 BRPM

## 2017-02-04 PROCEDURE — 88304 TISSUE EXAM BY PATHOLOGIST: CPT

## 2017-02-04 PROCEDURE — 86900 BLOOD TYPING SEROLOGIC ABO: CPT

## 2017-02-04 PROCEDURE — C1776: CPT

## 2017-02-04 PROCEDURE — 87186 SC STD MICRODIL/AGAR DIL: CPT

## 2017-02-04 PROCEDURE — C1713: CPT

## 2017-02-04 PROCEDURE — 80048 BASIC METABOLIC PNL TOTAL CA: CPT

## 2017-02-04 PROCEDURE — 88311 DECALCIFY TISSUE: CPT

## 2017-02-04 PROCEDURE — 87086 URINE CULTURE/COLONY COUNT: CPT

## 2017-02-04 PROCEDURE — 97162 PT EVAL MOD COMPLEX 30 MIN: CPT

## 2017-02-04 PROCEDURE — 82330 ASSAY OF CALCIUM: CPT

## 2017-02-04 PROCEDURE — 84132 ASSAY OF SERUM POTASSIUM: CPT

## 2017-02-04 PROCEDURE — 36430 TRANSFUSION BLD/BLD COMPNT: CPT

## 2017-02-04 PROCEDURE — 86923 COMPATIBILITY TEST ELECTRIC: CPT

## 2017-02-04 PROCEDURE — 84295 ASSAY OF SERUM SODIUM: CPT

## 2017-02-04 PROCEDURE — P9016: CPT

## 2017-02-04 PROCEDURE — 85018 HEMOGLOBIN: CPT

## 2017-02-04 PROCEDURE — 97161 PT EVAL LOW COMPLEX 20 MIN: CPT

## 2017-02-04 PROCEDURE — 36415 COLL VENOUS BLD VENIPUNCTURE: CPT

## 2017-02-04 PROCEDURE — 72170 X-RAY EXAM OF PELVIS: CPT

## 2017-02-04 PROCEDURE — 81003 URINALYSIS AUTO W/O SCOPE: CPT

## 2017-02-04 PROCEDURE — 97116 GAIT TRAINING THERAPY: CPT

## 2017-02-04 PROCEDURE — 86901 BLOOD TYPING SEROLOGIC RH(D): CPT

## 2017-02-04 PROCEDURE — 81001 URINALYSIS AUTO W/SCOPE: CPT

## 2017-02-04 PROCEDURE — 85027 COMPLETE CBC AUTOMATED: CPT

## 2017-02-04 PROCEDURE — 86850 RBC ANTIBODY SCREEN: CPT

## 2017-02-04 RX ORDER — OXYCODONE HYDROCHLORIDE 5 MG/1
1 TABLET ORAL
Qty: 60 | Refills: 0 | OUTPATIENT
Start: 2017-02-04

## 2017-02-04 RX ADMIN — Medication 325 MILLIGRAM(S): at 06:51

## 2017-02-04 RX ADMIN — LISINOPRIL 10 MILLIGRAM(S): 2.5 TABLET ORAL at 06:50

## 2017-02-04 RX ADMIN — Medication 975 MILLIGRAM(S): at 07:45

## 2017-02-04 RX ADMIN — OXYCODONE HYDROCHLORIDE 10 MILLIGRAM(S): 5 TABLET ORAL at 07:45

## 2017-02-04 RX ADMIN — FAMOTIDINE 20 MILLIGRAM(S): 10 INJECTION INTRAVENOUS at 06:51

## 2017-02-04 RX ADMIN — OXYCODONE HYDROCHLORIDE 10 MILLIGRAM(S): 5 TABLET ORAL at 06:52

## 2017-02-04 RX ADMIN — ATENOLOL 25 MILLIGRAM(S): 25 TABLET ORAL at 06:50

## 2017-02-04 RX ADMIN — Medication 975 MILLIGRAM(S): at 06:52

## 2017-02-04 RX ADMIN — Medication 100 MILLIGRAM(S): at 06:51

## 2017-02-04 RX ADMIN — POLYETHYLENE GLYCOL 3350 17 GRAM(S): 17 POWDER, FOR SOLUTION ORAL at 13:01

## 2017-02-04 RX ADMIN — CELECOXIB 200 MILLIGRAM(S): 200 CAPSULE ORAL at 10:24

## 2017-02-04 RX ADMIN — CELECOXIB 200 MILLIGRAM(S): 200 CAPSULE ORAL at 11:01

## 2017-02-04 NOTE — PROGRESS NOTE ADULT - SUBJECTIVE AND OBJECTIVE BOX
SUBJECTIVE: Patient seen and examined. Pt doing well o/n.  No f/c/n/v/cp/sob.     Vital Signs Last 24 Hrs  T(C): 36.2, Max: 36.5 (02-03 @ 15:22)  T(F): 97.2, Max: 97.7 (02-03 @ 15:22)  HR: 74 (73 - 93)  BP: 139/65 (130/65 - 139/65)  BP(mean): --  RR: 15 (15 - 16)  SpO2: 97% (96% - 98%)    I&O's Summary    I & Os for current day (as of 04 Feb 2017 08:48)  =============================================  IN: 1320 ml / OUT: 2200 ml / NET: -880 ml      Affected extremity:          Dressing: clean/dry/intact            Sensation: SILT         Motor exam: 5/5 TA/GS/EHL         warm well perfused; capillary refill <3 seconds     LABS:              MEDICATIONS:lactated ringers. 1000milliLiter(s) IV Continuous <Continuous>  aspirin enteric coated 325milliGRAM(s) Oral two times a day  acetaminophen   Tablet 650milliGRAM(s) Oral every 6 hours PRN  oxyCODONE IR 5milliGRAM(s) Oral every 4 hours PRN  oxyCODONE IR 10milliGRAM(s) Oral every 4 hours PRN  oxyCODONE ER Tablet 10milliGRAM(s) Oral every 12 hours  morphine  - Injectable 2milliGRAM(s) IV Push every 3 hours PRN  aluminum hydroxide/magnesium hydroxide/simethicone Suspension 30milliLiter(s) Oral four times a day PRN  ondansetron Injectable 4milliGRAM(s) IV Push every 6 hours PRN  famotidine    Tablet 20milliGRAM(s) Oral every 12 hours  polyethylene glycol 3350 17Gram(s) Oral daily  bisacodyl Suppository 10milliGRAM(s) Rectal daily PRN  senna 2Tablet(s) Oral at bedtime PRN  magnesium hydroxide Suspension 30milliLiter(s) Oral daily PRN  lisinopril 10milliGRAM(s) Oral daily  ATENolol  Tablet 25milliGRAM(s) Oral daily  acetaminophen   Tablet. 975milliGRAM(s) Oral every 8 hours  traMADol 50milliGRAM(s) Oral every 4 hours PRN  celecoxib 200milliGRAM(s) Oral two times a day after meals  docusate sodium 100milliGRAM(s) Oral three times a day  senna 2Tablet(s) Oral at bedtime  bisacodyl 5milliGRAM(s) Oral every 12 hours PRN  atorvastatin 10milliGRAM(s) Oral daily    Anticoagulation:  aspirin enteric coated 325milliGRAM(s) Oral two times a day    Antibiotics:     Pain medications:   acetaminophen   Tablet 650milliGRAM(s) Oral every 6 hours PRN  oxyCODONE IR 5milliGRAM(s) Oral every 4 hours PRN  oxyCODONE IR 10milliGRAM(s) Oral every 4 hours PRN  oxyCODONE ER Tablet 10milliGRAM(s) Oral every 12 hours  morphine  - Injectable 2milliGRAM(s) IV Push every 3 hours PRN  ondansetron Injectable 4milliGRAM(s) IV Push every 6 hours PRN  acetaminophen   Tablet. 975milliGRAM(s) Oral every 8 hours  traMADol 50milliGRAM(s) Oral every 4 hours PRN  celecoxib 200milliGRAM(s) Oral two times a day after meals    A/P :  Pt is a 76yo Female s/p L hip revision acetabular component 2/2/17   -    Pain control  -    DVT ppx: ASA     -    Weight bearing status: WBAT   -    Physical Therapy  -    Dispo: Home once PT aide is arranged

## 2017-02-07 NOTE — DISCHARGE NOTE ADULT - VISION (WITH CORRECTIVE LENSES IF THE PATIENT USUALLY WEARS THEM):
Normal vision: sees adequately in most situations; can see medication labels, newsprint Affect and characteristics of appearance, verbalizations, behaviors are appropriate

## 2017-02-08 DIAGNOSIS — N39.0 URINARY TRACT INFECTION, SITE NOT SPECIFIED: ICD-10-CM

## 2017-02-08 DIAGNOSIS — Z87.891 PERSONAL HISTORY OF NICOTINE DEPENDENCE: ICD-10-CM

## 2017-02-08 DIAGNOSIS — M24.852 OTHER SPECIFIC JOINT DERANGEMENTS OF LEFT HIP, NOT ELSEWHERE CLASSIFIED: ICD-10-CM

## 2017-02-08 DIAGNOSIS — T84.031A MECHANICAL LOOSENING OF INTERNAL LEFT HIP PROSTHETIC JOINT, INITIAL ENCOUNTER: ICD-10-CM

## 2017-02-08 DIAGNOSIS — Z96.641 PRESENCE OF RIGHT ARTIFICIAL HIP JOINT: ICD-10-CM

## 2017-02-08 DIAGNOSIS — E78.5 HYPERLIPIDEMIA, UNSPECIFIED: ICD-10-CM

## 2017-02-08 DIAGNOSIS — I10 ESSENTIAL (PRIMARY) HYPERTENSION: ICD-10-CM

## 2017-02-08 DIAGNOSIS — M25.552 PAIN IN LEFT HIP: ICD-10-CM

## 2017-03-09 ENCOUNTER — OUTPATIENT (OUTPATIENT)
Dept: OUTPATIENT SERVICES | Facility: HOSPITAL | Age: 77
LOS: 1 days | End: 2017-03-09
Payer: MEDICARE

## 2017-03-09 DIAGNOSIS — Z96.643 PRESENCE OF ARTIFICIAL HIP JOINT, BILATERAL: Chronic | ICD-10-CM

## 2017-03-09 PROBLEM — I10 ESSENTIAL (PRIMARY) HYPERTENSION: Chronic | Status: ACTIVE | Noted: 2017-01-31

## 2017-03-09 PROBLEM — E78.5 HYPERLIPIDEMIA, UNSPECIFIED: Chronic | Status: ACTIVE | Noted: 2017-01-31

## 2017-03-09 PROCEDURE — 73502 X-RAY EXAM HIP UNI 2-3 VIEWS: CPT

## 2017-03-09 PROCEDURE — 73502 X-RAY EXAM HIP UNI 2-3 VIEWS: CPT | Mod: 26,LT

## 2017-10-09 ENCOUNTER — APPOINTMENT (OUTPATIENT)
Dept: INTERNAL MEDICINE | Facility: CLINIC | Age: 77
End: 2017-10-09
Payer: MEDICARE

## 2017-10-09 VITALS — DIASTOLIC BLOOD PRESSURE: 70 MMHG | SYSTOLIC BLOOD PRESSURE: 120 MMHG | WEIGHT: 120 LBS | BODY MASS INDEX: 18.79 KG/M2

## 2017-10-09 DIAGNOSIS — I10 ESSENTIAL (PRIMARY) HYPERTENSION: ICD-10-CM

## 2017-10-09 DIAGNOSIS — Z23 ENCOUNTER FOR IMMUNIZATION: ICD-10-CM

## 2017-10-09 DIAGNOSIS — E78.00 PURE HYPERCHOLESTEROLEMIA, UNSPECIFIED: ICD-10-CM

## 2017-10-09 PROCEDURE — G0008: CPT

## 2017-10-09 PROCEDURE — 90662 IIV NO PRSV INCREASED AG IM: CPT

## 2017-10-09 PROCEDURE — 99213 OFFICE O/P EST LOW 20 MIN: CPT

## 2018-08-01 PROBLEM — N39.0 URINARY TRACT INFECTION, SITE NOT SPECIFIED: Chronic | Status: ACTIVE | Noted: 2017-02-01

## 2018-10-11 ENCOUNTER — APPOINTMENT (OUTPATIENT)
Dept: INTERNAL MEDICINE | Facility: CLINIC | Age: 78
End: 2018-10-11

## 2020-12-15 PROBLEM — Z87.440 HISTORY OF URINARY TRACT INFECTION: Status: RESOLVED | Noted: 2017-01-23 | Resolved: 2020-12-15

## 2021-01-08 NOTE — PATIENT PROFILE ADULT. - MEDICATION, ABILITY TO FOLLOW SCHEDULE, PROFILE
Addended by: DAVID RAMIREZ on: 1/8/2021 10:37 AM     Modules accepted: Orders    
Addended by: DAVID RAMIREZ on: 1/8/2021 10:40 AM     Modules accepted: Orders    
no

## 2024-08-06 ENCOUNTER — APPOINTMENT (OUTPATIENT)
Dept: ORTHOPEDIC SURGERY | Facility: CLINIC | Age: 84
End: 2024-08-06